# Patient Record
(demographics unavailable — no encounter records)

---

## 2024-10-28 NOTE — ASSESSMENT
[FreeTextEntry1] : A discussion regarding available pain management treatment options occurred with the patient.  These included interventional, rehabilitative, pharmacological, and alternative modalities. We will proceed with the following:    Interventional treatment options: - Proceed with repeat right PM L4-L5 LESI (80 mg Kenalog) with fluoroscopic guidance - Would likely consider lumbar facet direct intervention for ongoing axial low back pain; informational materials provided - see additional instructions below    Rehabilitative options:   - Continue physical therapy - participation in active HEP was discussed and encouraged as tolerated  Medication based treatment options:   - continue OTC NSAIDs on as-needed basis - see additional instructions below    Complementary treatment options:   - Weight management and lifestyle modifications discussed   - Advised use of lumbar support brace during periods of increased activity  Additional treatment recommendations as follows:   - Follow up 1-2 weeks post injection for assessment of efficacy and further treatment recommendations  The risks, benefits and alternatives of the proposed procedure were explained in detail with the patient. The risks outlined include but are not limited to infection, bleeding, post- dural puncture headache, nerve injury, a temporary increase in pain, failure to resolve symptoms, need for future interventions, allergic reaction, and possible elevation of blood sugar in diabetics if using corticosteroid.  All questions were answered to patient's apparent satisfaction, and he/she verbalized an understanding.  We have discussed the risks, benefits, and alternatives for NSAID therapy including but not limited to the risk of bleeding, thrombosis, gastric mucosal irritation/ulceration, allergic reaction and kidney dysfunction.  The patient verbalizes an understanding.  I, Keyla Ramirez NP, acting as scribe, attest that this documentation has been prepared under the direction and in the presence of Provider Sae Walton DO.    The documentation recorded by the scribe, in my presence, accurately reflects the service I personally performed, and the decisions made by me with my edits as appropriate.

## 2024-10-28 NOTE — PHYSICAL EXAM
[de-identified] : Constitutional:   - No acute distress   - Obese  Neurological:   - normal mood and affect   - alert and oriented x 3     Cardiovascular:   - grossly normal   Lumbar Spine Exam:   Inspection:  erythema (-)  ecchymosis (-)  rashes (-)  alignment: no scoliosis   Palpation:  Midline lumbar tenderness:           (-)  midline thoracic tenderness:         (-)  Lumbar paraspinal tenderness:   L (-); R (-)  thoracic paraspinal tenderness:  L (-); R (-)  sciatic nerve tenderness:            L (-); R (-)  SI joint tenderness:                     L (-); R (+)  GTB tenderness:                         L (-); R (-)   ROM: WNL; stiffness throughout ROM testing Pain with extremes of extension  Strength:                                     Right       Left     Hip Flexion:                (5/5)       (5/5)  Quadriceps:               (5/5)       (5/5)  Hamstrings:                (5/5)       (5/5)  Ankle Dorsiflexion:     (5/5)       (5/5)  EHL:                           (5/5)       (5/5)  Ankle Plantarflexion:  (5/5)       (5/5)   Special Tests:  SLR:                             R (=); L (-)  Facet loading:              R (+); L (-)  INDY test:                 R (-); L (-)  Hamstring tightness:    R (+); L (+)   Neurologic:  SILT throughout right lower extremity  SILT throughout left lower extremity   Reflexes normal and symmetric bilateral lower extremities   Gait:  Mildly antalgic gait  ambulates without assistive device

## 2024-10-28 NOTE — PHYSICAL EXAM
[de-identified] : Constitutional:   - No acute distress   - Obese  Neurological:   - normal mood and affect   - alert and oriented x 3     Cardiovascular:   - grossly normal   Lumbar Spine Exam:   Inspection:  erythema (-)  ecchymosis (-)  rashes (-)  alignment: no scoliosis   Palpation:  Midline lumbar tenderness:           (-)  midline thoracic tenderness:         (-)  Lumbar paraspinal tenderness:   L (-); R (-)  thoracic paraspinal tenderness:  L (-); R (-)  sciatic nerve tenderness:            L (-); R (-)  SI joint tenderness:                     L (-); R (+)  GTB tenderness:                         L (-); R (-)   ROM: WNL; stiffness throughout ROM testing Pain with extremes of extension  Strength:                                     Right       Left     Hip Flexion:                (5/5)       (5/5)  Quadriceps:               (5/5)       (5/5)  Hamstrings:                (5/5)       (5/5)  Ankle Dorsiflexion:     (5/5)       (5/5)  EHL:                           (5/5)       (5/5)  Ankle Plantarflexion:  (5/5)       (5/5)   Special Tests:  SLR:                             R (=); L (-)  Facet loading:              R (+); L (-)  INDY test:                 R (-); L (-)  Hamstring tightness:    R (+); L (+)   Neurologic:  SILT throughout right lower extremity  SILT throughout left lower extremity   Reflexes normal and symmetric bilateral lower extremities   Gait:  Mildly antalgic gait  ambulates without assistive device

## 2024-10-28 NOTE — HISTORY OF PRESENT ILLNESS
[Lower back] : lower back [8] : 8 [5] : 5 [Dull/Aching] : dull/aching [Intermittent] : intermittent [Leisure] : leisure [Meds] : meds [Walking/activity] : walking/activity [Retired] : Work status: retired [FreeTextEntry1] : 10/28/2024 - Patient presents today for a FUV regarding their low back pain. Patient was seen ~ 5 months ago. Patient reports doing well s/p L4-L5 LESI on 24 and reports a gradual return of pain 4 weeks ago. He reports his pain is now back to baseline and impacting his ADLs and quality of life. Pain continues to be right sided lower back without any radicular symptoms.  Presents today to discuss a repeat procedure with the hopes of decreasing his pain and increasing his functionality.   2024 - Patient presents for FUV after a L4-5 LESI on 2024. Patient reports a 50% reduction of pain s/p epidural, he is able to perform ADL with a greater degree of comfort and is happy with the results of the procedure.  2024 - Patient presents for FUV regarding their lower back pain. Patient continues to have right sided lower back pain without any radicular symptoms down the lower extremities.  He is participating in formal PT but has increased pain after sessions.  Wishes to be considered for interventional treatment options.  2024 - Patient presents for FUV to review their lumbar MRI from 3/7/2024. Patient reports some sustained reduction following the TPI and start of formal PT; his pain is in the right side of the lower back without any radiation to the lower extremities.   3/7/2024 - The patient presents for initial evaluation regarding their low back pain.  Patient complains of pain right sided lower back without any radiation to the lower extremities.  His pain has been ongoing for about 6 months with more recent exacerbation over the past 3 weeks.  He denies any inciting or exacerbating events.  His pain is exacerbated when going from sit to stand and laying down; uses Tylenol and topical analgesics PRN for pain management.  No imaging studies available.  No conservative therapy thus far.  Injections: 1) TPI 2) L4-5 LESI (2024)  Pertinent Surgical History: N/A   Imagin) MRI Lumbar Spine (3/7/2024) - [facility]  Physician Disclaimer: I have personally reviewed and confirmed all HPI data with the patient.  [] : no [FreeTextEntry9] : Tylenol  [de-identified] : stop moving  [de-identified] : Oc l mri

## 2024-10-28 NOTE — HISTORY OF PRESENT ILLNESS
[Lower back] : lower back [8] : 8 [5] : 5 [Dull/Aching] : dull/aching [Intermittent] : intermittent [Leisure] : leisure [Meds] : meds [Walking/activity] : walking/activity [Retired] : Work status: retired [FreeTextEntry1] : 10/28/2024 - Patient presents today for a FUV regarding their low back pain. Patient was seen ~ 5 months ago. Patient reports doing well s/p L4-L5 LESI on 24 and reports a gradual return of pain 4 weeks ago. He reports his pain is now back to baseline and impacting his ADLs and quality of life. Pain continues to be right sided lower back without any radicular symptoms.  Presents today to discuss a repeat procedure with the hopes of decreasing his pain and increasing his functionality.   2024 - Patient presents for FUV after a L4-5 LESI on 2024. Patient reports a 50% reduction of pain s/p epidural, he is able to perform ADL with a greater degree of comfort and is happy with the results of the procedure.  2024 - Patient presents for FUV regarding their lower back pain. Patient continues to have right sided lower back pain without any radicular symptoms down the lower extremities.  He is participating in formal PT but has increased pain after sessions.  Wishes to be considered for interventional treatment options.  2024 - Patient presents for FUV to review their lumbar MRI from 3/7/2024. Patient reports some sustained reduction following the TPI and start of formal PT; his pain is in the right side of the lower back without any radiation to the lower extremities.   3/7/2024 - The patient presents for initial evaluation regarding their low back pain.  Patient complains of pain right sided lower back without any radiation to the lower extremities.  His pain has been ongoing for about 6 months with more recent exacerbation over the past 3 weeks.  He denies any inciting or exacerbating events.  His pain is exacerbated when going from sit to stand and laying down; uses Tylenol and topical analgesics PRN for pain management.  No imaging studies available.  No conservative therapy thus far.  Injections: 1) TPI 2) L4-5 LESI (2024)  Pertinent Surgical History: N/A   Imagin) MRI Lumbar Spine (3/7/2024) - [facility]  Physician Disclaimer: I have personally reviewed and confirmed all HPI data with the patient.  [] : no [FreeTextEntry9] : Tylenol  [de-identified] : stop moving  [de-identified] : Oc l mri